# Patient Record
Sex: MALE | Race: WHITE | NOT HISPANIC OR LATINO
[De-identification: names, ages, dates, MRNs, and addresses within clinical notes are randomized per-mention and may not be internally consistent; named-entity substitution may affect disease eponyms.]

---

## 2018-09-26 ENCOUNTER — TRANSCRIPTION ENCOUNTER (OUTPATIENT)
Age: 43
End: 2018-09-26

## 2021-06-01 ENCOUNTER — TRANSCRIPTION ENCOUNTER (OUTPATIENT)
Age: 46
End: 2021-06-01

## 2022-10-06 ENCOUNTER — NON-APPOINTMENT (OUTPATIENT)
Age: 47
End: 2022-10-06

## 2022-10-06 ENCOUNTER — APPOINTMENT (OUTPATIENT)
Dept: ORTHOPEDIC SURGERY | Facility: CLINIC | Age: 47
End: 2022-10-06

## 2022-10-06 VITALS
SYSTOLIC BLOOD PRESSURE: 171 MMHG | BODY MASS INDEX: 24.39 KG/M2 | HEART RATE: 76 BPM | HEIGHT: 73 IN | DIASTOLIC BLOOD PRESSURE: 96 MMHG | WEIGHT: 184 LBS

## 2022-10-06 DIAGNOSIS — Z78.9 OTHER SPECIFIED HEALTH STATUS: ICD-10-CM

## 2022-10-06 DIAGNOSIS — M25.562 PAIN IN LEFT KNEE: ICD-10-CM

## 2022-10-06 DIAGNOSIS — Z80.9 FAMILY HISTORY OF MALIGNANT NEOPLASM, UNSPECIFIED: ICD-10-CM

## 2022-10-06 DIAGNOSIS — M25.552 PAIN IN LEFT HIP: ICD-10-CM

## 2022-10-06 PROCEDURE — 73564 X-RAY EXAM KNEE 4 OR MORE: CPT | Mod: LT

## 2022-10-06 PROCEDURE — 99204 OFFICE O/P NEW MOD 45 MIN: CPT

## 2022-10-06 PROCEDURE — 73502 X-RAY EXAM HIP UNI 2-3 VIEWS: CPT | Mod: LT

## 2022-10-06 RX ORDER — MELOXICAM 15 MG/1
15 TABLET ORAL
Qty: 30 | Refills: 3 | Status: ACTIVE | COMMUNITY
Start: 2022-10-06 | End: 1900-01-01

## 2022-10-13 NOTE — REASON FOR VISIT
[Initial Visit] : an initial visit for [Knee Pain] : knee pain [FreeTextEntry2] : Left Knee pain for 3 months and worsening

## 2022-10-13 NOTE — DISCUSSION/SUMMARY
[de-identified] : ALEJANDRO BLAIR  is an 46 year-year-old male who presents to the clinic with 3 months of left knee pain.  The symptoms began after a traumatic twisting injury. The patient initially tried OTC medications/NSAIDs with some relief, even though short lasting. Exercise therapy has had only temporary relief.  Radiographic findings show no obvious fracture or deformity,however patient has had continued pain and is unable to return to his usual activities. Given the history along with the physical exam findings of left lateral joint line pain and pain with deep squat, I am concerned about a possible meniscus tear. I discussed that radiographs show the bones well but do not show the soft tissues, such as ligaments, tendons and menisci well. At this point, the patient is quite debilitated by his  pain and is unable to return to his  activities of daily living such as taking care of his 3 and 5-year-old child and working full-time as a . Given the persistent symptoms, I discussed with the patient that his should continue with a structured home exercise protocol and avoid strenuous activities while we obtain an MRI to further evaluate for an internal derangement of the knee. The office will work to obtain the MRI. \par \par In the interim, the patient should continue to walk and perform the structured home exercises in the packet.  He is given a prescription for meloxicam and instructed to stop taking any other NSAIDs.  Is also instructed to take that with food and discontinue its use if he develops stomach pain.  I will call him with the results of the MRI and schedule follow-up based on the MRI results. They know to call the office or present to the ER if they develop worsening pain, swelling, numbness, tingling, inability to use the leg.\par

## 2022-10-13 NOTE — HISTORY OF PRESENT ILLNESS
[de-identified] : ALEJANDRO BLAIR  is an 46 year-year-old male presents today with a chief complaint of left knee pain. Patient describes onset over the last number of months. Patient works as a  and finds significant difficulty with squatting. The activities are limiting him enough that he is having difficulty playing with his 3 and 5-year-old child. \par \par Patient points to the lateral aspect of knee as maximum point of tenderness. Pain is typically 7/10 in severity, dull and achy but sometimes sharp in quality with certain activities. Symptoms are exacerbated by activity, or even walking/standing. They are improved with rest, and ice. Patient denies any radiation of symptoms beyond the surrounding area. Hip and ankle appear to be largely unrelated. \par \par To address the pathology, they have tried OTC medications/NSAIDs with some relief, even though short lasting. Injections have not been attempted yet. Exercise therapy has had only temporary relief but has helped maintain greater than 50 % range of motion. Things have gotten bad enough that patient will need assistive devices like the banister for going up and down stairs.\par \par At this point the patient is quite frustrated by their condition. As duration of symptoms exceeds months, they are here for further evaluation and discussion of possible diagnoses and management. They deny any further trauma. No fever or chills, no signs of infection. Today, patient does not state any other associated signs or complains outside of those described. \par \par A complete review of symptoms as well as past medical/surgical history, medications, allergies, social and family history, and other details of HPI and exam were reviewed per first visit intake form and updated accordingly. Additional and more relevant details are noted in further detail today.

## 2022-10-13 NOTE — PHYSICAL EXAM
[de-identified] : General Appearance / Station: Well developed, well nourished, in no acute distress [OVERWEIGHT | TEARFUL | POORLY COMMUNICATIVE | DISHELVED | DEMONSTRATES PAIN BEHAVIOR | POOR HISTORIAN]\par Orientation: Oriented to person, place, and time[NOT ORIENTED / CONFUSED]\par Gait & Station: Ambulates without assistive device[ASSISTIVE DEVICE REQUIRED ]\par Neurologic: Normal leg sensation [ ABNORMAL LEG SENSATION]\par Cardiovascular: Warm extremity [ABNORMAL DORSALIS PEDIS PULSE | ABNORMAL]\par Lymphatics: No lymphedema [INCREASED]\par Generalized Ligament Laxity: Normal [INCREASED]\par Stiffness: Normal [ABNORMAL TIGHT HAMSTRING / QUAD]\par \par LUMBAR SPINE:\par Nontender [TENDER] at lumbar spine\par Straight leg raise: Negative [POSITIVE]\par Motor: []/5 motor L2-S1\par Sensation: Intact [PARATHESIAS] L2-S1\par \par LEFT HIP:\par Range of motion: Painless [PAINFUL] internal and external rotation of the hip.\par Strength: Within Normal Limits [ POSITIVE TREN[place]BURG SIGN ]\par Palpation: Nontender [ TENDER ] at greater trochanter. Nontender [ TENDER ] at SI joint\par Stinchfield: Negative [ POSITIVE, with groin pain ]\par FADIR: Negative [ POSITIVE ]\par RADHA: Negative [ POSITIVE ]\par \par SYMPTOMATIC LEFT KNEE:\par Alignment: Neutral [VARUS] [VALGUS]\par Skin: normal[HEALING WOUNDS/ INCISIONS * HEALED WOUNDS / INCISIONS]\par Effusion: none [1+ | 2+ | 3+].\par Quadriceps: normal [MILD WEAKNESS| SIGNIFICANT WEAKNESS |UNABLE TO SLR].\par Range of motion: symmetrical [ABNORMAL * SYMMETRICAL BUT PAINFUL | LIMITED / GUARDING].\par PF crepitus: none [1+ | 2+ | 3+].\par PF apprehension: none [POSITIVE LATERAL].\par Patella / Patella Tendon: nontender [TENDER].\par Lachman's: negative [GUARDING | TRACE | 1+,FIRM | 1+, SOFT | 2+ | 3+]\par Valgus @ 30°: negative[GUARDING | 1+ | 2+ | 3+].\par Varus @ 30°: negative[GUARDING | 1 +| 2+ | 3+].\par Posterior drawer: negative[GUARDING | 1+ | 2+ | 3+].\par Palpation: nontender[TENDER @ MEDIAL JOINT LINE| TENDER @ LATERAL JOINT LINE| TENDER @ MEDIAL AND LATERAL JOINT LINES | TENDER @ MCL |TENDER @ FCL |TENDER @ PES BURSA | DIFFUSE].\par Meniscus signs: negative [POSITIVE ARUNA |POSITIVE JOINT LINE TENDERNESS |POSITIVE ARUNA AND JOINT LINE TENDERNESS].\par \par ASYMPTOMATIC RIGHT KNEE:\par Alignment: Neutral [VARUS] [VALGUS]\par Skin: normal[HEALING WOUNDS/ INCISIONS * HEALED WOUNDS / INCISIONS]\par Effusion: none [1+ | 2+ | 3+].\par Quadriceps: normal [MILD WEAKNESS| SIGNIFICANT WEAKNESS |UNABLE TO SLR].\par Range of motion: symmetrical [ABNORMAL * SYMMETRICAL BUT PAINFUL | LIMITED / GUARDING].\par PF crepitus: none [1+ | 2+ | 3+].\par PF apprehension: none [POSITIVE LATERAL].\par Patella / Patella Tendon: nontender [TENDER].\par Lachman's: negative [GUARDING | TRACE | 1+,FIRM | 1+, SOFT | 2+ | 3+]\par Valgus @ 30°: negative[GUARDING | 1+ | 2+ | 3+].\par Varus @ 30°: negative[GUARDING | 1 +| 2+ | 3+].\par Posterior drawer: negative[GUARDING | 1+ | 2+ | 3+].\par Palpation: nontender[TENDER @ MEDIAL JOINT LINE| TENDER @ LATERAL JOINT LINE| TENDER @ MEDIAL AND LATERAL JOINT LINES | TENDER @ MCL |TENDER @ FCL |TENDER @ PES BURSA | DIFFUSE].\par Meniscus signs: negative [POSITIVE ARUNA |POSITIVE JOINT LINE TENDERNESS |POSITIVE ARUNA AND JOINT LINE TENDERNESS].\par \par  [de-identified] : Imaging: Standing 4 views of the left knee show left knee mild[]moderate[]severe arthritis worse in the medial[]lateral[]patellofemoral with loss of joint space, subchondral sclerosis, marginal osteophyte formations, and subchondral cyst formation. There are no signs of fracture. There is no [a large] knee effusion. The soft tissues appear unremarkable\par \par Imaging: AP Pelvis and two views of the left hip show left hip mild[]moderate[]severe osteoarthritis with loss of joint space, subchondral sclerosis, marginal osteophyte formations, and subchondral cyst. There are no signs of fracture. The soft tissues appear unremarkable\par

## 2022-10-14 ENCOUNTER — NON-APPOINTMENT (OUTPATIENT)
Age: 47
End: 2022-10-14

## 2025-03-10 ENCOUNTER — APPOINTMENT (OUTPATIENT)
Dept: ORTHOPEDIC SURGERY | Facility: CLINIC | Age: 50
End: 2025-03-10
Payer: COMMERCIAL

## 2025-03-10 VITALS
HEIGHT: 73 IN | WEIGHT: 195 LBS | BODY MASS INDEX: 25.84 KG/M2 | DIASTOLIC BLOOD PRESSURE: 82 MMHG | SYSTOLIC BLOOD PRESSURE: 161 MMHG

## 2025-03-10 DIAGNOSIS — M25.562 PAIN IN LEFT KNEE: ICD-10-CM

## 2025-03-10 PROCEDURE — G2211 COMPLEX E/M VISIT ADD ON: CPT | Mod: NC

## 2025-03-10 PROCEDURE — 73564 X-RAY EXAM KNEE 4 OR MORE: CPT | Mod: LT

## 2025-03-10 PROCEDURE — 99214 OFFICE O/P EST MOD 30 MIN: CPT

## 2025-03-10 RX ORDER — MELOXICAM 15 MG/1
15 TABLET ORAL
Qty: 30 | Refills: 0 | Status: ACTIVE | COMMUNITY
Start: 2025-03-10 | End: 1900-01-01

## 2025-03-17 ENCOUNTER — TRANSCRIPTION ENCOUNTER (OUTPATIENT)
Age: 50
End: 2025-03-17

## 2025-03-17 ENCOUNTER — APPOINTMENT (OUTPATIENT)
Dept: MRI IMAGING | Facility: CLINIC | Age: 50
End: 2025-03-17
Payer: COMMERCIAL

## 2025-03-17 PROCEDURE — 73721 MRI JNT OF LWR EXTRE W/O DYE: CPT | Mod: LT

## 2025-03-31 ENCOUNTER — APPOINTMENT (OUTPATIENT)
Dept: ORTHOPEDIC SURGERY | Facility: CLINIC | Age: 50
End: 2025-03-31
Payer: COMMERCIAL

## 2025-03-31 ENCOUNTER — NON-APPOINTMENT (OUTPATIENT)
Age: 50
End: 2025-03-31

## 2025-03-31 VITALS — WEIGHT: 195 LBS | BODY MASS INDEX: 25.84 KG/M2 | HEIGHT: 73 IN

## 2025-03-31 DIAGNOSIS — M17.12 UNILATERAL PRIMARY OSTEOARTHRITIS, LEFT KNEE: ICD-10-CM

## 2025-03-31 PROCEDURE — G2211 COMPLEX E/M VISIT ADD ON: CPT | Mod: NC

## 2025-03-31 PROCEDURE — 99213 OFFICE O/P EST LOW 20 MIN: CPT
